# Patient Record
Sex: MALE | Race: WHITE | URBAN - NONMETROPOLITAN AREA
[De-identification: names, ages, dates, MRNs, and addresses within clinical notes are randomized per-mention and may not be internally consistent; named-entity substitution may affect disease eponyms.]

---

## 2023-03-16 ENCOUNTER — OFFICE VISIT (OUTPATIENT)
Dept: URGENT CARE | Facility: CLINIC | Age: 58
End: 2023-03-16
Payer: COMMERCIAL

## 2023-03-16 VITALS
HEIGHT: 63 IN | TEMPERATURE: 97.5 F | HEART RATE: 86 BPM | BODY MASS INDEX: 24.8 KG/M2 | DIASTOLIC BLOOD PRESSURE: 60 MMHG | SYSTOLIC BLOOD PRESSURE: 114 MMHG | WEIGHT: 140 LBS | OXYGEN SATURATION: 95 % | RESPIRATION RATE: 14 BRPM

## 2023-03-16 DIAGNOSIS — J01.90 ACUTE BACTERIAL SINUSITIS: ICD-10-CM

## 2023-03-16 DIAGNOSIS — B96.89 ACUTE BACTERIAL SINUSITIS: ICD-10-CM

## 2023-03-16 PROCEDURE — 99203 OFFICE O/P NEW LOW 30 MIN: CPT | Performed by: STUDENT IN AN ORGANIZED HEALTH CARE EDUCATION/TRAINING PROGRAM

## 2023-03-16 RX ORDER — AMOXICILLIN AND CLAVULANATE POTASSIUM 875; 125 MG/1; MG/1
1 TABLET, FILM COATED ORAL 2 TIMES DAILY
Qty: 10 TABLET | Refills: 0 | Status: SHIPPED | OUTPATIENT
Start: 2023-03-16 | End: 2023-03-21

## 2023-03-16 NOTE — PROGRESS NOTES
"Subjective:   Waylon Darby is a 57 y.o. male who presents for Sinusitis (Pt states started off with a cold, fever, congestion, sore throat, eye pain x over 1 week )      HPI:  Pleasant 57-year-old male presents to urgent care for increasing sinus congestion, sinus pressure, and sinus pain.  Reports having a cold just over 1 week ago.  He states that the majority of symptoms including a sore throat have resolved.  He is no longer having a fever as well.  He states his main issue is continued sinus issues.  Denies ear pain, ear discharge, chest pain, palpitations, cough, shortness of breath, nausea, vomiting, abdominal pain, diarrhea, dizziness, or headache.    Medications:    amoxicillin-clavulanate Tabs  fluticasone  loratadine/pseudo SR Tb12    Allergies: Patient has no known allergies.    Problem List: Waylon Darby does not have any pertinent problems on file.    Surgical History:  Past Surgical History:   Procedure Laterality Date    INGUINAL HERNIA REPAIR Right 1999       Past Social Hx: Waylon Darby  reports that he has been smoking cigarettes. He has never used smokeless tobacco. He reports current alcohol use. He reports current drug use. Drug: Marijuana.     Past Family Hx:  Waylon Darby family history includes Genetic Disorder in his paternal uncle; Heart Disease in his father and mother; Hypertension in his father and mother.     Problem list, medications, and allergies reviewed by myself today in Epic.     Objective:     /60 (BP Location: Left arm, Patient Position: Sitting, BP Cuff Size: Adult)   Pulse 86   Temp 36.4 °C (97.5 °F) (Temporal)   Resp 14   Ht 1.6 m (5' 3\")   Wt 63.5 kg (140 lb)   SpO2 95%   BMI 24.80 kg/m²     Physical Exam  Vitals reviewed.   Constitutional:       General: He is not in acute distress.     Appearance: Normal appearance.   HENT:      Head: Normocephalic.      Right Ear: Tympanic membrane, ear canal and external ear normal.      Left Ear: Tympanic membrane, " ear canal and external ear normal.      Ears:      Comments: Mild amount of fluid present behind the TMs bilaterally.     Nose: Mucosal edema and congestion present.      Right Nostril: No septal hematoma.      Left Nostril: No septal hematoma.      Right Sinus: Maxillary sinus tenderness present.      Left Sinus: Maxillary sinus tenderness present.      Mouth/Throat:      Mouth: Mucous membranes are moist.   Eyes:      Conjunctiva/sclera: Conjunctivae normal.      Pupils: Pupils are equal, round, and reactive to light.   Cardiovascular:      Rate and Rhythm: Normal rate and regular rhythm.      Pulses: Normal pulses.      Heart sounds: Normal heart sounds. No murmur heard.  Pulmonary:      Effort: Pulmonary effort is normal. No respiratory distress.      Breath sounds: Normal breath sounds. No stridor. No wheezing, rhonchi or rales.   Musculoskeletal:      Cervical back: Normal range of motion.   Lymphadenopathy:      Cervical: No cervical adenopathy.   Neurological:      Mental Status: He is alert.       Assessment/Plan:     Diagnosis and associated orders:     1. Acute bacterial sinusitis  amoxicillin-clavulanate (AUGMENTIN) 875-125 MG Tab         Comments/MDM:     Patient's presentation physical exam findings are consistent with acute bacterial sinusitis.  URI started over 1 week ago.  Worsening sinus pressure and sinus congestion over the past 5 days.  Sinus congestion and pressure have been present for over 1 week.  We will use Augmentin.  Patient educated on use this medication and the possible side effects including retraction.  No known allergies medication.  Advised using Flonase and Mucinex as well for his symptoms.  Vitals all within normal limits.  No acute otitis media bilaterally.  No signs of bacterial throat infection.  Pulmonary exam shows no abnormal findings.  No signs of pneumonia.  Patient is well-appearing and nontoxic.  ED/return precautions were given.         Differential diagnosis, natural  history, supportive care, and indications for immediate follow-up discussed.    Advised the patient to follow-up with the primary care physician for recheck, reevaluation, and consideration of further management.    Please note that this dictation was created using voice recognition software. I have made a reasonable attempt to correct obvious errors, but I expect that there are errors of grammar and possibly content that I did not discover before finalizing the note.    Electronically signed by Dom Hebert PA-C.